# Patient Record
Sex: MALE | Race: WHITE | NOT HISPANIC OR LATINO | Employment: FULL TIME | URBAN - NONMETROPOLITAN AREA
[De-identification: names, ages, dates, MRNs, and addresses within clinical notes are randomized per-mention and may not be internally consistent; named-entity substitution may affect disease eponyms.]

---

## 2021-05-17 ENCOUNTER — OFFICE VISIT (OUTPATIENT)
Dept: CARDIOLOGY | Facility: CLINIC | Age: 36
End: 2021-05-17

## 2021-05-17 VITALS
HEIGHT: 74 IN | SYSTOLIC BLOOD PRESSURE: 122 MMHG | TEMPERATURE: 97.8 F | WEIGHT: 192 LBS | HEART RATE: 67 BPM | BODY MASS INDEX: 24.64 KG/M2 | DIASTOLIC BLOOD PRESSURE: 78 MMHG

## 2021-05-17 DIAGNOSIS — F41.9 ANXIETY: ICD-10-CM

## 2021-05-17 DIAGNOSIS — R00.2 PALPITATIONS: Primary | ICD-10-CM

## 2021-05-17 DIAGNOSIS — R01.1 MURMUR, CARDIAC: ICD-10-CM

## 2021-05-17 DIAGNOSIS — F17.200 SMOKING: ICD-10-CM

## 2021-05-17 DIAGNOSIS — E05.90 HYPERTHYROIDISM: ICD-10-CM

## 2021-05-17 PROCEDURE — 99244 OFF/OP CNSLTJ NEW/EST MOD 40: CPT | Performed by: INTERNAL MEDICINE

## 2021-05-17 PROCEDURE — 93000 ELECTROCARDIOGRAM COMPLETE: CPT | Performed by: INTERNAL MEDICINE

## 2021-05-17 RX ORDER — ACETAMINOPHEN 500 MG
500 TABLET ORAL EVERY 6 HOURS PRN
COMMUNITY

## 2021-06-02 ENCOUNTER — APPOINTMENT (OUTPATIENT)
Dept: CARDIOLOGY | Facility: HOSPITAL | Age: 36
End: 2021-06-02

## 2021-06-17 RX ORDER — BISOPROLOL FUMARATE 5 MG/1
5 TABLET, FILM COATED ORAL DAILY
Qty: 30 TABLET | Refills: 6 | Status: SHIPPED | OUTPATIENT
Start: 2021-06-17 | End: 2021-12-13

## 2021-06-17 NOTE — TELEPHONE ENCOUNTER
Patient aware of extended holter results and recommendations to add bisoprolol 5 mg daily.  Script pended for approval.

## 2021-12-13 RX ORDER — BISOPROLOL FUMARATE 5 MG/1
TABLET, FILM COATED ORAL
Qty: 90 TABLET | Refills: 2 | Status: SHIPPED | OUTPATIENT
Start: 2021-12-13 | End: 2022-09-06

## 2022-09-06 RX ORDER — BISOPROLOL FUMARATE 5 MG/1
TABLET, FILM COATED ORAL
Qty: 90 TABLET | Refills: 2 | Status: SHIPPED | OUTPATIENT
Start: 2022-09-06

## 2023-06-05 RX ORDER — BISOPROLOL FUMARATE 5 MG/1
TABLET, FILM COATED ORAL
Qty: 90 TABLET | Refills: 2 | OUTPATIENT
Start: 2023-06-05

## 2023-06-06 RX ORDER — BISOPROLOL FUMARATE 5 MG/1
5 TABLET, FILM COATED ORAL DAILY
Qty: 90 TABLET | Refills: 0 | Status: SHIPPED | OUTPATIENT
Start: 2023-06-06

## 2023-06-06 NOTE — TELEPHONE ENCOUNTER
Caller: Matti Kay    Relationship: Self    Best call back number: [phone]    Requested Prescriptions:   Requested Prescriptions     Pending Prescriptions Disp Refills    bisoprolol (ZEBeta) 5 MG tablet 90 tablet 2     Sig: Take 1 tablet by mouth Daily.        Pharmacy where request should be sent: [id number] - [place] - [address]  - [phone] [facility] [phone] FX     Last office visit with prescribing clinician: Visit date not found   Last telemedicine visit with prescribing clinician: Visit date not found   Next office visit with prescribing clinician: Visit date not found     Additional details provided by patient: Additional details provided by patient: PT STATES THAT HE HAS TWO DAYS LEFT OF HIS MEDICATION AND HAS RUN OUT OF REFILLS. PT IS WONDERING IF DR. HAYES CAN SEND OVER A REFILL FOR 90 DAYS SEEING AS HIS FOLLOW UP APPT ISN'T UNTIL AUGUST. PLEASE REACH OUT TO PT TO FURTHER ADVISE. THANK YOU!      Does the patient have less than a 3 day supply:  [x] Yes  [] No    Would you like a call back once the refill request has been completed: [x] Yes [] No    If the office needs to give you a call back, can they leave a voicemail: [x] Yes [] No    Francia Lal   06/06/23 12:19 EDT

## 2023-08-21 ENCOUNTER — OFFICE VISIT (OUTPATIENT)
Dept: CARDIOLOGY | Facility: CLINIC | Age: 38
End: 2023-08-21

## 2023-08-21 VITALS
DIASTOLIC BLOOD PRESSURE: 62 MMHG | BODY MASS INDEX: 23.15 KG/M2 | HEART RATE: 76 BPM | SYSTOLIC BLOOD PRESSURE: 118 MMHG | WEIGHT: 180.4 LBS | HEIGHT: 74 IN

## 2023-08-21 DIAGNOSIS — E05.90 HYPERTHYROIDISM: ICD-10-CM

## 2023-08-21 DIAGNOSIS — F17.200 SMOKING: ICD-10-CM

## 2023-08-21 DIAGNOSIS — R00.2 PALPITATIONS: Primary | ICD-10-CM

## 2023-08-21 DIAGNOSIS — F41.9 ANXIETY: ICD-10-CM

## 2023-08-21 PROCEDURE — 99213 OFFICE O/P EST LOW 20 MIN: CPT | Performed by: NURSE PRACTITIONER

## 2023-08-21 NOTE — PROGRESS NOTES
Chief Complaint   Patient presents with    Follow-up     Cardiac management. Has no cardiac complaints today.    LABS     No current labs     Med Refill     Needs refills on bisoprolol 90 day supply to [facility]       Subjective       Matti Kay is a 38 y.o. male seen in May 2021 for initial cardiac consultation.  Secondary to palpitations he wore cardiac monitor for 9 days.  Baseline was sinus with average heart rate 73 bpm.  Rare PAC, PVC and few short runs of sinus tachycardia noted.  Patient had occasional palpitations with no EKG changes.  Advised to try bisoprolol 5 mg once daily.    Today he returns to the office for follow-up visit.  No cardiac symptoms or complaints voiced.  He is maintaining his normal daily activity that issue.    Cardiac History:    Past Surgical History:   Procedure Laterality Date    CONVERTED (HISTORICAL) HOLTER  06/17/2021    >9 Days. AVG 73.        Current Outpatient Medications   Medication Sig Dispense Refill    acetaminophen (TYLENOL) 500 MG tablet Take 1 tablet by mouth Every 6 (Six) Hours As Needed for Mild Pain.      bisoprolol (ZEBeta) 5 MG tablet Take 1 tablet by mouth Daily. 90 tablet 4     No current facility-administered medications for this visit.       Patient has no known allergies.    Past Medical History:   Diagnosis Date    Anxiety        Social History     Socioeconomic History    Marital status:    Tobacco Use    Smoking status: Some Days     Packs/day: 0.25     Years: 1.00     Pack years: 0.25     Types: Cigarettes    Smokeless tobacco: Never    Tobacco comments:     started about a year ago, only smoking 1-2 cig a day   Vaping Use    Vaping Use: Never used   Substance and Sexual Activity    Alcohol use: Never    Drug use: Never       Family History   Problem Relation Age of Onset    Arrhythmia Mother     Hypertension Mother     Hyperlipidemia Mother     Hypertension Father     No Known Problems Brother     No Known Problems Maternal Grandmother  "    Heart attack Maternal Grandfather          in his 40's with MI    Cancer Paternal Grandmother     Cancer Paternal Grandfather     No Known Problems Daughter        Review of Systems   Constitutional: Negative for malaise/fatigue.   Cardiovascular:  Negative for chest pain, dyspnea on exertion, irregular heartbeat, leg swelling, near-syncope and palpitations.   Respiratory:  Negative for shortness of breath and sleep disturbances due to breathing.    Skin:  Negative for color change.      BP Readings from Last 5 Encounters:   23 118/62   21 122/78       Wt Readings from Last 5 Encounters:   23 81.8 kg (180 lb 6.4 oz)   21 87.1 kg (192 lb)       Objective     /62 (BP Location: Left arm, Patient Position: Sitting)   Pulse 76   Ht 188 cm (74\")   Wt 81.8 kg (180 lb 6.4 oz)   BMI 23.16 kg/mý     Vitals and nursing note reviewed.   Constitutional:       Appearance: Healthy appearance. Not in distress.   Eyes:      Conjunctiva/sclera: Conjunctivae normal.      Pupils: Pupils are equal, round, and reactive to light.   HENT:      Head: Normocephalic.   Pulmonary:      Effort: Pulmonary effort is normal.      Breath sounds: Normal breath sounds.   Cardiovascular:      PMI at left midclavicular line. Normal rate. Regular rhythm.      Murmurs: There is no murmur.   Pulses:     Intact distal pulses.   Edema:     Peripheral edema absent.   Abdominal:      General: Bowel sounds are normal.      Palpations: Abdomen is soft.   Musculoskeletal: Normal range of motion.      Cervical back: Normal range of motion and neck supple. Skin:     General: Skin is warm and dry.   Neurological:      Mental Status: Alert, oriented to person, place, and time and oriented to person, place and time.        Procedures: None today         Assessment & Plan   Diagnoses and all orders for this visit:    1. Palpitations (Primary)    2. Hyperthyroidism    3. Smoking    4. Anxiety    Other orders  -     bisoprolol " (ZEBeta) 5 MG tablet; Take 1 tablet by mouth Daily.  Dispense: 90 tablet; Refill: 4    Palpitations  -Heart rate and rhythm normal  -Palpitations denied  -Continue beta-blocker, refills given as noted above  -Continue conservative efforts including limited caffeine, adequate stress and rest, good hydration    Hyperthyroidism  -Labs per PCP office  -If not done by next visit will plan to order    Smoking  -Cessation encouraged    Anxiety  -Admits currently well managed    Annual follow-up visit scheduled.

## 2023-08-23 RX ORDER — BISOPROLOL FUMARATE 5 MG/1
5 TABLET, FILM COATED ORAL DAILY
Qty: 90 TABLET | Refills: 4 | Status: SHIPPED | OUTPATIENT
Start: 2023-08-23

## 2024-09-04 RX ORDER — BISOPROLOL FUMARATE 5 MG/1
5 TABLET, FILM COATED ORAL DAILY
Qty: 90 TABLET | Refills: 0 | Status: SHIPPED | OUTPATIENT
Start: 2024-09-04

## 2024-10-02 ENCOUNTER — OFFICE VISIT (OUTPATIENT)
Dept: CARDIOLOGY | Facility: CLINIC | Age: 39
End: 2024-10-02
Payer: COMMERCIAL

## 2024-10-02 VITALS
SYSTOLIC BLOOD PRESSURE: 100 MMHG | HEIGHT: 75 IN | HEART RATE: 60 BPM | WEIGHT: 185.8 LBS | DIASTOLIC BLOOD PRESSURE: 72 MMHG | BODY MASS INDEX: 23.1 KG/M2

## 2024-10-02 DIAGNOSIS — F17.200 SMOKING: ICD-10-CM

## 2024-10-02 DIAGNOSIS — R00.2 PALPITATIONS: Primary | ICD-10-CM

## 2024-10-02 DIAGNOSIS — E55.9 VITAMIN D INSUFFICIENCY: ICD-10-CM

## 2024-10-02 DIAGNOSIS — Z13.220 LIPID SCREENING: ICD-10-CM

## 2024-10-02 PROCEDURE — 99214 OFFICE O/P EST MOD 30 MIN: CPT | Performed by: NURSE PRACTITIONER

## 2024-10-02 RX ORDER — BISOPROLOL FUMARATE 5 MG/1
5 TABLET, FILM COATED ORAL DAILY
Qty: 90 TABLET | Refills: 3 | Status: SHIPPED | OUTPATIENT
Start: 2024-10-02

## 2024-10-02 NOTE — PROGRESS NOTES
"Chief Complaint   Patient presents with    Follow-up     Cardiac management    Lab     No current labs.     Palpitations     Has occasional when exercising or doing anything strenuous. Does not have often.     Med Refill     Needs refills on Bisoprolol-90 day        HPI:  KENDAL Kay is a 38 y.o. male seen in May 2021 for initial cardiac consultation.  Secondary to palpitations he wore cardiac monitor for 9 days.  Baseline was sinus with average heart rate 73 bpm.  Rare PAC, PVC and few short runs of sinus tachycardia noted.  Patient had occasional palpitations with no EKG changes.  Advised to try bisoprolol 5 mg once daily.     Today he returns to the office for follow-up visit. Patient denies chest pain, pressure, tightness, dizziness, shortness of air. He reports palpitations in the form of \"skipped beats\" when he does strenuous exercise like running. No recent labs and no PCP.  He does not have an established PCP and we did discuss importance of establishing care just for routine screenings and monitoring of general overall health.    Cardiac History:    Past Surgical History:   Procedure Laterality Date    CONVERTED (HISTORICAL) HOLTER  2021    >9 Days. AVG 73.        Current Outpatient Medications   Medication Sig Dispense Refill    acetaminophen (TYLENOL) 500 MG tablet Take 1 tablet by mouth Every 6 (Six) Hours As Needed for Mild Pain.      bisoprolol (ZEBeta) 5 MG tablet Take 1 tablet by mouth Daily. 90 tablet 3     No current facility-administered medications for this visit.       Patient has no known allergies.    Past Medical History:   Diagnosis Date    Anxiety        Social History     Socioeconomic History    Marital status:    Tobacco Use    Smoking status: Former     Current packs/day: 0.00     Average packs/day: 0.3 packs/day for 1 year (0.3 ttl pk-yrs)     Types: Cigarettes     Quit date: 2024     Years since quittin.6    Smokeless tobacco: Never    Tobacco " "comments:     started about a year ago, only smoking 1-2 cig a day   Vaping Use    Vaping status: Never Used   Substance and Sexual Activity    Alcohol use: Never    Drug use: Never    Sexual activity: Defer       Family History   Problem Relation Age of Onset    Arrhythmia Mother     Hypertension Mother     Hyperlipidemia Mother     Hypertension Father     No Known Problems Brother     No Known Problems Maternal Grandmother     Heart attack Maternal Grandfather          in his 40's with MI    Cancer Paternal Grandmother     Cancer Paternal Grandfather     No Known Problems Daughter        Vitals:   /72 (BP Location: Right arm, Patient Position: Sitting)   Pulse 60   Ht 190.5 cm (75\")   Wt 84.3 kg (185 lb 12.8 oz)   BMI 23.22 kg/m²     Physical Exam  Vitals and nursing note reviewed.   Neck:      Vascular: No carotid bruit.   Cardiovascular:      Rate and Rhythm: Normal rate and regular rhythm.      Pulses: Normal pulses.      Heart sounds: Normal heart sounds. No murmur heard.     No friction rub. No gallop.   Pulmonary:      Effort: Pulmonary effort is normal.      Breath sounds: Normal breath sounds and air entry.   Musculoskeletal:      Right lower leg: No edema.      Left lower leg: No edema.   Skin:     General: Skin is warm and dry.      Capillary Refill: Capillary refill takes less than 2 seconds.   Neurological:      Mental Status: He is alert and oriented to person, place, and time.       Procedures     Assessment & Plan     Diagnoses and all orders for this visit:    1. Palpitations (Primary)  -     Comprehensive Metabolic Panel; Future  -     CBC & Differential; Future  -     Magnesium; Future  -     T4, Free; Future  -     TSH; Future  -     T3, Reverse; Future    2. Smoking    3. Vitamin D insufficiency  -     Vitamin D,25-Hydroxy; Future    4. Lipid screening  -     Lipid Panel; Future    Other orders  -     bisoprolol (ZEBeta) 5 MG tablet; Take 1 tablet by mouth Daily.  Dispense: 90 " tablet; Refill: 3    Palpitations  - Controlled overall, continue bisoprolol 5 mg   - She does report increase in palpitations with strenuous exercise such as running but nothing concerning  -Labs ordered including metabolic panel, magnesium, thyroid panel    Smoking  - He has quit smoking as of approximately 8 months ago, congratulated him on improved lifestyle changes    Stable from a cardiac standpoint. No further testing recommended at this time. No medication changes made today. Labs ordered for screening as he does not have a PCP and no recent labs. Refill bisoprolol sent to pharmacy     Visit Diagnoses:    ICD-10-CM ICD-9-CM   1. Palpitations  R00.2 785.1   2. Smoking  F17.200 305.1   3. Vitamin D insufficiency  E55.9 268.9   4. Lipid screening  Z13.220 V77.91       Meds Ordered During Visit:  New Medications Ordered This Visit   Medications    bisoprolol (ZEBeta) 5 MG tablet     Sig: Take 1 tablet by mouth Daily.     Dispense:  90 tablet     Refill:  3       Follow Up Appointment:   Return in about 1 year (around 10/2/2025), or if symptoms worsen or fail to improve.           This document has been electronically signed by KT dEward  October 2, 2024 14:06 EDT    Dictated Utilizing Dragon Dictation: Part of this note may be an electronic transcription/translation of spoken language to printed text using the Dragon Dictation System.